# Patient Record
Sex: FEMALE | Race: WHITE | NOT HISPANIC OR LATINO | URBAN - NONMETROPOLITAN AREA
[De-identification: names, ages, dates, MRNs, and addresses within clinical notes are randomized per-mention and may not be internally consistent; named-entity substitution may affect disease eponyms.]

---

## 2024-03-18 ENCOUNTER — OFFICE VISIT (OUTPATIENT)
Dept: OTOLARYNGOLOGY | Facility: CLINIC | Age: 34
End: 2024-03-18
Payer: COMMERCIAL

## 2024-03-18 VITALS
DIASTOLIC BLOOD PRESSURE: 90 MMHG | TEMPERATURE: 98.5 F | HEART RATE: 55 BPM | WEIGHT: 195.8 LBS | SYSTOLIC BLOOD PRESSURE: 132 MMHG | BODY MASS INDEX: 34.69 KG/M2 | HEIGHT: 63 IN | OXYGEN SATURATION: 98 %

## 2024-03-18 DIAGNOSIS — J34.2 DNS (DEVIATED NASAL SEPTUM): ICD-10-CM

## 2024-03-18 DIAGNOSIS — J32.9 RECURRENT SINUSITIS: Primary | ICD-10-CM

## 2024-03-18 DIAGNOSIS — J34.3 NASAL TURBINATE HYPERTROPHY: ICD-10-CM

## 2024-03-18 PROCEDURE — 99203 OFFICE O/P NEW LOW 30 MIN: CPT | Performed by: PHYSICIAN ASSISTANT

## 2024-03-18 RX ORDER — FLUTICASONE PROPIONATE 50 MCG
1 SPRAY, SUSPENSION (ML) NASAL DAILY
COMMUNITY

## 2024-03-18 RX ORDER — CETIRIZINE HYDROCHLORIDE 10 MG/1
10 TABLET ORAL DAILY
COMMUNITY

## 2024-03-18 NOTE — PROGRESS NOTES
Review of Systems   Constitutional: Negative.    HENT:  Positive for sinus pressure.    Eyes: Negative.    Respiratory: Negative.     Cardiovascular: Negative.    Gastrointestinal: Negative.    Endocrine: Negative.    Genitourinary: Negative.    Musculoskeletal: Negative.    Skin: Negative.    Allergic/Immunologic: Negative.    Neurological: Negative.    Hematological: Negative.    Psychiatric/Behavioral: Negative.

## 2024-03-18 NOTE — PROGRESS NOTES
[facility] Otolaryngology New Patient visit      Eneida Kessler is a 33 y.o. female who presents with a chief complaint of sinuses    Independent historian: none    Pertinent elements of the history:  From September to January, had a sinus infection almost every month   Estimates at least 4 infections  Treated with z oleg, sometimes needed a second round  Seemed to get better after treatment  Has trouble tolerating amoxicillin    Usually mild symptoms, but most recent had severe pressure in the right maxillary sinus    Symptoms with infection  Maxillary pressure R>L  PND and rhinorrhea   Mild reduction in sense of smell  Nasal congestion   Green/yellow nasal discharge     Flonase qHS since December/January   Zyrtec   Tried Astelin, made her sneeze    Seasonal allergies     History of migraine  Usually left sided  Hasn't had them in several years     No allergy testing  No dental issues   No imaging      Review of any relevant imaging: images from any scan reviewed personally  Scans: none  Labs:   Notes:     Review of Systems:  As above    PMHx:  No past medical history on file.     FAMHx:  No family history on file.    SOCHx:  Social History     Socioeconomic History    Marital status: /Civil Union     Spouse name: Not on file    Number of children: Not on file    Years of education: Not on file    Highest education level: Not on file   Occupational History    Not on file   Tobacco Use    Smoking status: Not on file    Smokeless tobacco: Not on file   Substance and Sexual Activity    Alcohol use: Not on file    Drug use: Not on file    Sexual activity: Not on file   Other Topics Concern    Not on file   Social History Narrative    Not on file     Social Determinants of Health     Financial Resource Strain: Low Risk  (3/4/2024)    Received from [facility]    Overall Financial Resource Strain (CARDIA)     Difficulty of Paying Living Expenses: Not very hard   Food Insecurity: No Food Insecurity (3/4/2024)     "Received from [facility]    Hunger Vital Sign     Worried About Running Out of Food in the Last Year: Never true     Ran Out of Food in the Last Year: Never true   Transportation Needs: No Transportation Needs (3/4/2024)    Received from [facility]    PRAPARE - Transportation     Lack of Transportation (Medical): No     Lack of Transportation (Non-Medical): No   Physical Activity: Insufficiently Active (3/4/2024)    Received from [facility]    Exercise Vital Sign     Days of Exercise per Week: 2 days     Minutes of Exercise per Session: 30 min   Stress: No Stress Concern Present (3/4/2024)    Received from [facility]    [employer] - Occupational Stress Questionnaire     Feeling of Stress : Not at all   Social Connections: Moderately Integrated (3/4/2024)    Received from [facility]    Social Connection and Isolation Panel [NHANES]     Frequency of Communication with Friends and Family: More than three times a week     Frequency of Social Gatherings with Friends and Family: Once a week     Attends Scientologist Services: 1 to 4 times per year     Active Member of Clubs or Organizations: No     Attends Club or Organization Meetings: Never     Marital Status:    Intimate Partner Violence: High Risk (2/3/2023)    Received from [facility]    SDOH Do You Feel Safe At Home     Do you feel safe at home?: Deferred   Housing Stability: Low Risk  (3/4/2024)    Received from [facility]    Housing Stability Vital Sign     Unable to Pay for Housing in the Last Year: No     Number of Places Lived in the Last Year: 1     Unstable Housing in the Last Year: No       Allergies:  No Known Allergies     MEDS:  Reviewed      Physical exam: (abnormal findings appear in bold and supercede any conflicting normal findings listed below)    /90   Pulse 55   Temp 98.5 °F (36.9 °C)   Ht 5' 3\" (1.6 m)   Wt 88.8 kg (195 lb 12.8 oz)   SpO2 98%   BMI 34.68 kg/m² "     Constitutional:  Well developed, well nourished and groomed, in no acute distress.     Eyes:  Extra-ocular movements intact, pupils equally round and reactive to light and accommodation, the lids and conjunctivae are normal in appearance.    Head: Atraumatic, normocephalic, no visible scalp lesions, bony palpation unremarkable without stepoffs, parotid and submandibular salivary glands non-tender to palpation and without masses bilaterally.     Ears:  Auricles normal in appearance bilaterally, mastoid prominence non-tender, external auditory canals clear bilaterally, tympanic membranes intact bilaterally without evidence of middle ear effusion or masses, normal appearing ossicles.     Nose/Sinuses:  External appearance unremarkable, no maxillary or frontal sinus tenderness to palpation bilaterally. Anterior rhinoscopy demonstrates pink mucosa. No polyps or other masses identified. Turbinates are non-edematous. No evidence of purulent drainage. DNS-L, inferior turbinate hypertrophy.    Oral Cavity:  Moist mucus membranes, gums and dentition unremarkable, no oral mucosal masses or lesions, floor of mouth soft, tongue mobile without masses or lesions.     Oropharynx:  Base of tongue soft and without masses, tonsils bilaterally unremarkable, soft palate mucosa unremarkable.     Neck:  No visible or palpable cervical lesions or lymphadenopathy, thyroid gland is normal in size and symmetry and without masses, normal laryngeal elevation with swallowing.     Cardiovascular:  Normal rate and rhythm, no palpable thrills, no jugulovenous distension observed.  Respiratory:  Normal respiratory effort without evidence of retractions or use of accessory muscles.  Integument:  Normal appearing without observed masses or lesions.  Neurologic:  Cranial nerves II-XII intact bilaterally.  Psychiatric:  Alert and oriented to time, place and person, normal affect.      Procedure:    Assessment:  1. Recurrent sinusitis        2. DNS  "(deviated nasal septum)        3. Nasal turbinate hypertrophy            Plan:  1. Eneida Kessler is a 33 y.o. female with acute and chronic problems as above who presents for evaluation of recurrent sinusitis. Was treated for at least 4 sinus infections since September, usually with zpak. Sometimes required re-treatment. Last infection was in January. She feels well today without symptoms.     On exam, mild DNS-L, inferior turbinate hypertrophy. No mucopus or polyps.    We discussed recurrent sinusitis, treatment options, indications for CT sinus.    Agree to observation.  Continue daily Flonase and Zyrtec.  Consider adding saline irrigation.     Follow up in 4 months.  She was encouraged to notify our office if she develops another infection. Will try to squeeze her in for an updated exam with nasal endoscopy.            ** Please Note: Portions of the record may have been created with voice recognition software. Occasional wrong word or \"sound a like\" substitutions may have occurred due to the inherent limitations of voice recognition software. There may also be notations and random deletions of words or characters from malfunctioning software. Read the chart carefully and recognize, using context, where substitutions/deletions have occurred.**    "

## 2024-07-15 ENCOUNTER — OFFICE VISIT (OUTPATIENT)
Dept: OTOLARYNGOLOGY | Facility: CLINIC | Age: 34
End: 2024-07-15
Payer: COMMERCIAL

## 2024-07-15 VITALS
HEIGHT: 63 IN | HEART RATE: 97 BPM | OXYGEN SATURATION: 98 % | WEIGHT: 209 LBS | BODY MASS INDEX: 37.03 KG/M2 | RESPIRATION RATE: 18 BRPM | DIASTOLIC BLOOD PRESSURE: 80 MMHG | TEMPERATURE: 98 F | SYSTOLIC BLOOD PRESSURE: 110 MMHG

## 2024-07-15 DIAGNOSIS — J32.9 RECURRENT SINUSITIS: Primary | ICD-10-CM

## 2024-07-15 DIAGNOSIS — O99.519 PREGNANCY RHINITIS: ICD-10-CM

## 2024-07-15 DIAGNOSIS — J34.3 NASAL TURBINATE HYPERTROPHY: ICD-10-CM

## 2024-07-15 DIAGNOSIS — J34.2 DNS (DEVIATED NASAL SEPTUM): ICD-10-CM

## 2024-07-15 DIAGNOSIS — J31.0 PREGNANCY RHINITIS: ICD-10-CM

## 2024-07-15 PROCEDURE — 99213 OFFICE O/P EST LOW 20 MIN: CPT | Performed by: PHYSICIAN ASSISTANT

## 2024-07-15 RX ORDER — ASPIRIN 81 MG/1
TABLET, CHEWABLE ORAL
COMMUNITY

## 2024-07-15 NOTE — PROGRESS NOTES
Review of Systems   Constitutional: Negative.    HENT:  Positive for rhinorrhea.    Eyes: Negative.    Respiratory: Negative.     Cardiovascular: Negative.    Gastrointestinal: Negative.    Endocrine: Negative.    Genitourinary: Negative.    Musculoskeletal: Negative.    Skin: Negative.    Allergic/Immunologic: Negative.    Neurological: Negative.    Hematological: Negative.    Psychiatric/Behavioral: Negative.

## 2024-07-15 NOTE — PROGRESS NOTES
[facility] Otolaryngology Follow up visit      Eneida Kessler is a 34 y.o. female who presents with a chief complaint of sinuses    Independent historian: none    Pertinent elements of the history:  Doing well since last visit  She did recently have an infection, treated with a zpak    Mostly bothered by drainage--rhinorrhea, PND  Feels it's driven by the weather  No congestion    Currently 16wk pregnant  Due in December    Continues to use Flonase and Zyrtec      03/18/24 history   From September to January, had a sinus infection almost every month   Estimates at least 4 infections  Treated with z oleg, sometimes needed a second round  Seemed to get better after treatment  Has trouble tolerating amoxicillin    Usually mild symptoms, but most recent had severe pressure in the right maxillary sinus    Symptoms with infection  Maxillary pressure R>L  PND and rhinorrhea   Mild reduction in sense of smell  Nasal congestion   Green/yellow nasal discharge     Flonase qHS since December/January   Zyrtec   Tried Astelin, made her sneeze    Seasonal allergies     History of migraine  Usually left sided  Hasn't had them in several years     No allergy testing  No dental issues   No imaging      Review of any relevant imaging: images from any scan reviewed personally  Scans: none  Labs:   Notes:     Review of Systems:  As above    PMHx:  No past medical history on file.     FAMHx:  No family history on file.    SOCHx:  Social History     Socioeconomic History    Marital status: /Civil Union     Spouse name: None    Number of children: None    Years of education: None    Highest education level: None   Occupational History    None   Tobacco Use    Smoking status: Never    Smokeless tobacco: Never   Vaping Use    Vaping status: Never Used   Substance and Sexual Activity    Alcohol use: Never    Drug use: Never    Sexual activity: Never   Other Topics Concern    None   Social History Narrative    None     Social Determinants of  Health     Financial Resource Strain: Low Risk  (4/24/2024)    Received from [facility]    Overall Financial Resource Strain (CARDIA)     Difficulty of Paying Living Expenses: Not very hard   Food Insecurity: No Food Insecurity (4/24/2024)    Received from [facility]    Hunger Vital Sign     Worried About Running Out of Food in the Last Year: Never true     Ran Out of Food in the Last Year: Never true   Transportation Needs: No Transportation Needs (4/24/2024)    Received from [facility]    PRAPARE - Transportation     Lack of Transportation (Medical): No     Lack of Transportation (Non-Medical): No   Physical Activity: Insufficiently Active (4/24/2024)    Received from [facility]    Exercise Vital Sign     Days of Exercise per Week: 3 days     Minutes of Exercise per Session: 30 min   Stress: No Stress Concern Present (4/24/2024)    Received from [facility]    [employer] - Occupational Stress Questionnaire     Feeling of Stress : Not at all   Social Connections: Moderately Integrated (4/24/2024)    Received from [facility]    Social Connection and Isolation Panel [NHANES]     Frequency of Communication with Friends and Family: Three times a week     Frequency of Social Gatherings with Friends and Family: Once a week     Attends Gnosticist Services: 1 to 4 times per year     Active Member of Clubs or Organizations: No     Attends Club or Organization Meetings: Never     Marital Status:    Intimate Partner Violence: High Risk (2/3/2023)    [facility], [facility]    SDOH Do You Feel Safe At Home     Do you feel safe at home?: Deferred   Housing Stability: Low Risk  (3/4/2024)    Received from [facility]    Housing Stability Vital Sign     Unable to Pay for Housing in the Last Year: No     Number of Places Lived in the Last Year: 1     In the last 12 months, was there a time when you did not have a steady place to sleep or  "slept in a shelter (including now)?: No       Allergies:  No Known Allergies     MEDS:  Reviewed      Physical exam: (abnormal findings appear in bold and supercede any conflicting normal findings listed below)    /80   Pulse 97   Temp 98 °F (36.7 °C) (Temporal)   Resp 18   Ht 5' 3\" (1.6 m)   Wt 94.8 kg (209 lb)   SpO2 98%   BMI 37.02 kg/m²     Constitutional:  Well developed, well nourished and groomed, in no acute distress.     Eyes:  Extra-ocular movements intact, pupils equally round and reactive to light and accommodation, the lids and conjunctivae are normal in appearance.    Head: Atraumatic, normocephalic, no visible scalp lesions, bony palpation unremarkable without stepoffs, parotid and submandibular salivary glands non-tender to palpation and without masses bilaterally.     Ears:  Auricles normal in appearance bilaterally, mastoid prominence non-tender, external auditory canals clear bilaterally, tympanic membranes intact bilaterally without evidence of middle ear effusion or masses, normal appearing ossicles.     Nose/Sinuses:  External appearance unremarkable, no maxillary or frontal sinus tenderness to palpation bilaterally. Anterior rhinoscopy demonstrates pink mucosa. No polyps or other masses identified. Turbinates are non-edematous. No evidence of purulent drainage. DNS-L, inferior turbinate hypertrophy. Allergic-looking secretions bilaterally.    Oral Cavity:  Moist mucus membranes, gums and dentition unremarkable, no oral mucosal masses or lesions, floor of mouth soft, tongue mobile without masses or lesions.     Oropharynx:  Base of tongue soft and without masses, tonsils bilaterally unremarkable, soft palate mucosa unremarkable.     Neck:  No visible or palpable cervical lesions or lymphadenopathy, thyroid gland is normal in size and symmetry and without masses, normal laryngeal elevation with swallowing.     Cardiovascular:  Normal rate and rhythm, no palpable thrills, no " "jugulovenous distension observed.  Respiratory:  Normal respiratory effort without evidence of retractions or use of accessory muscles.  Integument:  Normal appearing without observed masses or lesions.  Neurologic:  Cranial nerves II-XII intact bilaterally.  Psychiatric:  Alert and oriented to time, place and person, normal affect.      Procedure:    Assessment:  1. Recurrent sinusitis        2. DNS (deviated nasal septum)        3. Nasal turbinate hypertrophy        4. Pregnancy rhinitis              Plan:  1. Eneida Kessler is a 34 y.o. female with acute and chronic problems as above who presents for re-evaluation of recurrent sinusitis. Was treated for at least 4 sinus infections between September and January. Recently treated for another with a zpak and symptoms cleared up. She does have some rhinorrhea/PND that she attributes to the weather changes.     On exam, mild DNS-L, inferior turbinate hypertrophy. Allergic-looking secretions. No mucopus or polyps.    We discussed recurrent sinusitis as well as rhinitis of pregnancy. Treatment options and indications for CT sinus were reviewed.     Agree to continued observation.  Continue daily Flonase and Zyrtec.  Consider using saline irrigation, humidifier to bedroom.     Follow up in 6 months.  She was encouraged to notify our office if she develops another infection. Will try to squeeze her in for an updated exam with nasal endoscopy.              ** Please Note: Portions of the record may have been created with voice recognition software. Occasional wrong word or \"sound a like\" substitutions may have occurred due to the inherent limitations of voice recognition software. There may also be notations and random deletions of words or characters from malfunctioning software. Read the chart carefully and recognize, using context, where substitutions/deletions have occurred.**    "

## 2025-01-21 ENCOUNTER — OFFICE VISIT (OUTPATIENT)
Dept: OTOLARYNGOLOGY | Facility: CLINIC | Age: 35
End: 2025-01-21
Payer: COMMERCIAL

## 2025-01-21 VITALS
BODY MASS INDEX: 37.39 KG/M2 | HEART RATE: 77 BPM | SYSTOLIC BLOOD PRESSURE: 118 MMHG | OXYGEN SATURATION: 99 % | HEIGHT: 63 IN | TEMPERATURE: 98.4 F | RESPIRATION RATE: 16 BRPM | WEIGHT: 211 LBS | DIASTOLIC BLOOD PRESSURE: 76 MMHG

## 2025-01-21 DIAGNOSIS — J32.9 RECURRENT SINUSITIS: Primary | ICD-10-CM

## 2025-01-21 DIAGNOSIS — J34.2 DNS (DEVIATED NASAL SEPTUM): ICD-10-CM

## 2025-01-21 DIAGNOSIS — J34.3 NASAL TURBINATE HYPERTROPHY: ICD-10-CM

## 2025-01-21 PROCEDURE — 92511 NASOPHARYNGOSCOPY: CPT | Performed by: PHYSICIAN ASSISTANT

## 2025-01-21 PROCEDURE — 99213 OFFICE O/P EST LOW 20 MIN: CPT | Performed by: PHYSICIAN ASSISTANT

## 2025-01-21 RX ORDER — ELAGOLIX 150 MG/1
150 TABLET, FILM COATED ORAL
COMMUNITY
Start: 2025-01-13

## 2025-01-21 RX ORDER — ACETAMINOPHEN 500 MG
1000 TABLET ORAL
COMMUNITY
Start: 2024-12-01

## 2025-01-21 RX ORDER — OXYCODONE HYDROCHLORIDE 5 MG/1
5 TABLET ORAL EVERY 4 HOURS PRN
COMMUNITY
Start: 2024-12-01

## 2025-01-21 RX ORDER — IBUPROFEN 200 MG
600 TABLET ORAL
COMMUNITY
Start: 2024-12-01

## 2025-01-21 NOTE — PROGRESS NOTES
"ASSESSMENT:        1. Recurrent sinusitis        2. DNS (deviated nasal septum)        3. Nasal turbinate hypertrophy             PLAN: Consider CT of the sinuses, but the patient wishes to defer this right now, since she is not that symptomatic.   Likewise, she wishes to defer allergy testing.  I therefore recommend she continue with Zyrtec daily, as well as Flonase 2 sprays each nostril once daily.  Recheck in the Fall, since historically she is more symptomatic at that time.       Patient ID: Eneida Kessler is a 34 y.o. female.    SUBJECTIVE: The patient presents for follow-up sinusitis.  She admits to intermittent nasal congestion, denies recent sinus pressure or headache.  She further denies sinus infections since her last office visit on 7/15/2024.  She states she is presently taking Zyrtec and Flonase daily.    With regard to her pregnancy, she states she had viable delivery at 39 weeks via scheduled , without complications.       OBJECTIVE:    Vitals:    25 1256   BP: 118/76   Patient Position: Sitting   Cuff Size: Adult   Pulse: 77   Resp: 16   Temp: 98.4 °F (36.9 °C)   TempSrc: Temporal   SpO2: 99%   Weight: 95.7 kg (211 lb)   Height: 5' 3\" (1.6 m)        Physical Exam   Auricles normal.  No mastoid region tenderness or swelling bilaterally.  External auditory canals are patent.  Tympanic membranes appear grossly intact, without discernible middle ear effusions.  Nares patent.  Anterior rhinoscopy reveals normal mucosa bilaterally.  Oral mucosa moist.  Oropharynx clear.  No palpable periauricular or cervical lymphadenopathy.    PROCEDURE:     Flexible Fiberoptic NasoPharyngoscopy Procedure Note (04080):  Indication:  Nasal congestion  Verbal consent obtained.  Provider: Orlando Lowry PA-C  Anesthesia: 4% lidocaine, oxymetazoline  Scope passed through nasal cavities bilaterally: Nasal septal deviation to the left.  Somewhat prominent inferior nasal turbinates.  No discernible purulence or polyps " bilaterally.  Nasopharynx: unremarkable.  Eustachian tube orifices: unremarkable.  Patient tolerated procedure well without complications